# Patient Record
Sex: MALE | URBAN - METROPOLITAN AREA
[De-identification: names, ages, dates, MRNs, and addresses within clinical notes are randomized per-mention and may not be internally consistent; named-entity substitution may affect disease eponyms.]

---

## 2023-02-02 ENCOUNTER — TELEPHONE (OUTPATIENT)
Dept: FAMILY MEDICINE CLINIC | Age: 48
End: 2023-02-02

## 2023-02-02 NOTE — TELEPHONE ENCOUNTER
Pt girlfriend, Harshil Giles 10[birth year]6/75, current Pt of [facility] is requesting that boyfriend be taken on as a new Pt. Explained to Pt that [facility] is not currently taking new Pt's but I would ask. Stated she understood and to call her back at [phone] if it was possible to schedule. Please advise.